# Patient Record
Sex: MALE | Race: OTHER | NOT HISPANIC OR LATINO | URBAN - METROPOLITAN AREA
[De-identification: names, ages, dates, MRNs, and addresses within clinical notes are randomized per-mention and may not be internally consistent; named-entity substitution may affect disease eponyms.]

---

## 2019-02-18 ENCOUNTER — INPATIENT (INPATIENT)
Facility: HOSPITAL | Age: 24
LOS: 1 days | Discharge: HOME | DRG: 203 | End: 2019-02-20
Admitting: HOSPITALIST
Payer: COMMERCIAL

## 2019-02-18 VITALS
WEIGHT: 255.07 LBS | HEART RATE: 126 BPM | OXYGEN SATURATION: 96 % | DIASTOLIC BLOOD PRESSURE: 103 MMHG | TEMPERATURE: 98 F | SYSTOLIC BLOOD PRESSURE: 163 MMHG | RESPIRATION RATE: 22 BRPM | HEIGHT: 72 IN

## 2019-02-18 DIAGNOSIS — J20.9 ACUTE BRONCHITIS, UNSPECIFIED: ICD-10-CM

## 2019-02-18 LAB
ALBUMIN SERPL ELPH-MCNC: 4.2 G/DL — SIGNIFICANT CHANGE UP (ref 3.3–5.2)
ALP SERPL-CCNC: 104 U/L — SIGNIFICANT CHANGE UP (ref 40–120)
ALT FLD-CCNC: 24 U/L — SIGNIFICANT CHANGE UP
ANION GAP SERPL CALC-SCNC: 14 MMOL/L — SIGNIFICANT CHANGE UP (ref 5–17)
AST SERPL-CCNC: 18 U/L — SIGNIFICANT CHANGE UP
BASOPHILS # BLD AUTO: 0 K/UL — SIGNIFICANT CHANGE UP (ref 0–0.2)
BASOPHILS NFR BLD AUTO: 0.1 % — SIGNIFICANT CHANGE UP (ref 0–2)
BILIRUB SERPL-MCNC: 0.6 MG/DL — SIGNIFICANT CHANGE UP (ref 0.4–2)
BUN SERPL-MCNC: 13 MG/DL — SIGNIFICANT CHANGE UP (ref 8–20)
CALCIUM SERPL-MCNC: 9.6 MG/DL — SIGNIFICANT CHANGE UP (ref 8.6–10.2)
CHLORIDE SERPL-SCNC: 98 MMOL/L — SIGNIFICANT CHANGE UP (ref 98–107)
CO2 SERPL-SCNC: 24 MMOL/L — SIGNIFICANT CHANGE UP (ref 22–29)
CREAT SERPL-MCNC: 1.22 MG/DL — SIGNIFICANT CHANGE UP (ref 0.5–1.3)
EOSINOPHIL # BLD AUTO: 1.1 K/UL — HIGH (ref 0–0.5)
EOSINOPHIL NFR BLD AUTO: 7.2 % — HIGH (ref 0–5)
GLUCOSE SERPL-MCNC: 158 MG/DL — HIGH (ref 70–115)
HCOV OC43 RNA SPEC QL NAA+PROBE: DETECTED
HCT VFR BLD CALC: 44.8 % — SIGNIFICANT CHANGE UP (ref 42–52)
HGB BLD-MCNC: 15.2 G/DL — SIGNIFICANT CHANGE UP (ref 14–18)
LACTATE BLDV-MCNC: 1.2 MMOL/L — SIGNIFICANT CHANGE UP (ref 0.5–2)
LYMPHOCYTES # BLD AUTO: 19.7 % — LOW (ref 20–55)
LYMPHOCYTES # BLD AUTO: 3.1 K/UL — SIGNIFICANT CHANGE UP (ref 1–4.8)
MCHC RBC-ENTMCNC: 27.8 PG — SIGNIFICANT CHANGE UP (ref 27–31)
MCHC RBC-ENTMCNC: 33.9 G/DL — SIGNIFICANT CHANGE UP (ref 32–36)
MCV RBC AUTO: 81.9 FL — SIGNIFICANT CHANGE UP (ref 80–94)
MONOCYTES # BLD AUTO: 0.9 K/UL — HIGH (ref 0–0.8)
MONOCYTES NFR BLD AUTO: 5.5 % — SIGNIFICANT CHANGE UP (ref 3–10)
NEUTROPHILS # BLD AUTO: 10.5 K/UL — HIGH (ref 1.8–8)
NEUTROPHILS NFR BLD AUTO: 67.2 % — SIGNIFICANT CHANGE UP (ref 37–73)
PLATELET # BLD AUTO: 266 K/UL — SIGNIFICANT CHANGE UP (ref 150–400)
POTASSIUM SERPL-MCNC: 4.1 MMOL/L — SIGNIFICANT CHANGE UP (ref 3.5–5.3)
POTASSIUM SERPL-SCNC: 4.1 MMOL/L — SIGNIFICANT CHANGE UP (ref 3.5–5.3)
PROT SERPL-MCNC: 8.8 G/DL — HIGH (ref 6.6–8.7)
RAPID RVP RESULT: DETECTED
RBC # BLD: 5.47 M/UL — SIGNIFICANT CHANGE UP (ref 4.6–6.2)
RBC # FLD: 13.8 % — SIGNIFICANT CHANGE UP (ref 11–15.6)
SODIUM SERPL-SCNC: 136 MMOL/L — SIGNIFICANT CHANGE UP (ref 135–145)
WBC # BLD: 15.6 K/UL — HIGH (ref 4.8–10.8)
WBC # FLD AUTO: 15.6 K/UL — HIGH (ref 4.8–10.8)

## 2019-02-18 PROCEDURE — 99223 1ST HOSP IP/OBS HIGH 75: CPT

## 2019-02-18 PROCEDURE — 71046 X-RAY EXAM CHEST 2 VIEWS: CPT | Mod: 26

## 2019-02-18 PROCEDURE — 99285 EMERGENCY DEPT VISIT HI MDM: CPT

## 2019-02-18 RX ORDER — IPRATROPIUM/ALBUTEROL SULFATE 18-103MCG
3 AEROSOL WITH ADAPTER (GRAM) INHALATION ONCE
Qty: 0 | Refills: 0 | Status: COMPLETED | OUTPATIENT
Start: 2019-02-18 | End: 2019-02-18

## 2019-02-18 RX ORDER — CEFTRIAXONE 500 MG/1
1 INJECTION, POWDER, FOR SOLUTION INTRAMUSCULAR; INTRAVENOUS ONCE
Qty: 0 | Refills: 0 | Status: COMPLETED | OUTPATIENT
Start: 2019-02-18 | End: 2019-02-18

## 2019-02-18 RX ORDER — FAMOTIDINE 10 MG/ML
20 INJECTION INTRAVENOUS ONCE
Qty: 0 | Refills: 0 | Status: COMPLETED | OUTPATIENT
Start: 2019-02-18 | End: 2019-02-18

## 2019-02-18 RX ORDER — DEXAMETHASONE 0.5 MG/5ML
10 ELIXIR ORAL ONCE
Qty: 0 | Refills: 0 | Status: COMPLETED | OUTPATIENT
Start: 2019-02-18 | End: 2019-02-18

## 2019-02-18 RX ORDER — SODIUM CHLORIDE 9 MG/ML
1000 INJECTION INTRAMUSCULAR; INTRAVENOUS; SUBCUTANEOUS ONCE
Qty: 0 | Refills: 0 | Status: COMPLETED | OUTPATIENT
Start: 2019-02-18 | End: 2019-02-18

## 2019-02-18 RX ORDER — DIPHENHYDRAMINE HCL 50 MG
50 CAPSULE ORAL ONCE
Qty: 0 | Refills: 0 | Status: COMPLETED | OUTPATIENT
Start: 2019-02-18 | End: 2019-02-18

## 2019-02-18 RX ORDER — MAGNESIUM SULFATE 500 MG/ML
2 VIAL (ML) INJECTION ONCE
Qty: 0 | Refills: 0 | Status: COMPLETED | OUTPATIENT
Start: 2019-02-18 | End: 2019-02-18

## 2019-02-18 RX ORDER — AZITHROMYCIN 500 MG/1
500 TABLET, FILM COATED ORAL ONCE
Qty: 0 | Refills: 0 | Status: COMPLETED | OUTPATIENT
Start: 2019-02-18 | End: 2019-02-18

## 2019-02-18 RX ORDER — LEVALBUTEROL 1.25 MG/.5ML
0.63 SOLUTION, CONCENTRATE RESPIRATORY (INHALATION) EVERY 6 HOURS
Qty: 0 | Refills: 0 | Status: DISCONTINUED | OUTPATIENT
Start: 2019-02-18 | End: 2019-02-19

## 2019-02-18 RX ADMIN — SODIUM CHLORIDE 1000 MILLILITER(S): 9 INJECTION INTRAMUSCULAR; INTRAVENOUS; SUBCUTANEOUS at 20:23

## 2019-02-18 RX ADMIN — FAMOTIDINE 20 MILLIGRAM(S): 10 INJECTION INTRAVENOUS at 19:26

## 2019-02-18 RX ADMIN — AZITHROMYCIN 500 MILLIGRAM(S): 500 TABLET, FILM COATED ORAL at 22:29

## 2019-02-18 RX ADMIN — Medication 3 MILLILITER(S): at 17:30

## 2019-02-18 RX ADMIN — Medication 50 MILLIGRAM(S): at 19:25

## 2019-02-18 RX ADMIN — Medication 102 MILLIGRAM(S): at 19:23

## 2019-02-18 RX ADMIN — CEFTRIAXONE 100 GRAM(S): 500 INJECTION, POWDER, FOR SOLUTION INTRAMUSCULAR; INTRAVENOUS at 22:31

## 2019-02-18 RX ADMIN — Medication 10 MILLIGRAM(S): at 20:14

## 2019-02-18 RX ADMIN — AZITHROMYCIN 255 MILLIGRAM(S): 500 TABLET, FILM COATED ORAL at 21:29

## 2019-02-18 RX ADMIN — Medication 50 GRAM(S): at 20:14

## 2019-02-18 RX ADMIN — Medication 2 GRAM(S): at 21:14

## 2019-02-18 RX ADMIN — SODIUM CHLORIDE 1000 MILLILITER(S): 9 INJECTION INTRAMUSCULAR; INTRAVENOUS; SUBCUTANEOUS at 19:23

## 2019-02-18 NOTE — H&P ADULT - HISTORY OF PRESENT ILLNESS
23yoM hx HTN, not on meds, presenting with mildly productive cough and nasal congestion for the past week then yesterday developed exertional dyspnea associated with audible wheezing.  He has been taking OTC cold medications and reports improvement in his cough.  Also went to his PMD a few days ago and was prescribed Augmentin.  Denies fevers, chills, chest pain, abdominal pain, nausea, vomiting, diarrhea and FHx or personal hx of asthma. In ED, pt afebrile, tachycardia and was coronavirus positive, WBC 15.  CXR on my read appears clear.  Received ceftriaxone, azithromycin, Mg, decadron, DuoNebs x 2 in ED.    Per ED provider note, had O2 sat of 93% when laying flat and that with tachycardia meet exclusion criteria for observation unit. 23yoM hx HTN, not on meds, presenting with productive cough and nasal congestion for the past week then yesterday developed exertional dyspnea associated with audible wheezing.  He has been taking OTC cold medications and reports improvement in his cough.  Also went to his PMD a few days ago and was prescribed Augmentin.  Denies fevers, chills, chest pain, abdominal pain, nausea, vomiting, diarrhea and FHx or personal hx of asthma. In ED, pt afebrile, tachycardia and was coronavirus positive, WBC 15.  CXR on my read appears clear.  Received ceftriaxone, azithromycin, Mg, decadron, DuoNebs x 2 in ED.    Per ED provider note, had O2 sat of 93% when laying flat and that with tachycardia meet exclusion criteria for observation unit.

## 2019-02-18 NOTE — ED STATDOCS - SECONDARY DIAGNOSIS.
Pneumonia due to infectious organism, unspecified laterality, unspecified part of lung SOB (shortness of breath) Tachypnea

## 2019-02-18 NOTE — ED ADULT TRIAGE NOTE - CHIEF COMPLAINT QUOTE
Patient arrived to ED today with c/o cough for the past week with white sputum.  Patient states when he is walking his cough gets worse and he feels SOB when coughing.

## 2019-02-18 NOTE — ED STATDOCS - ENMT, MLM
Nasal mucosa clear.  Mouth with normal mucosa. Neck supple, FROM. enlarged tonsils, erythema to throat.

## 2019-02-18 NOTE — ED STATDOCS - PROGRESS NOTE DETAILS
I performed the initial face to face bedside interview with this patient regarding history of present illness, review of symptoms and past medical, social and family history.  I completed an independent physical examination.  I was the initial provider who evaluated this patient.  The history, review of symptoms and examination was documented by the scribe in my presence and I attest to the accuracy of the documentation.  I have signed out the follow up of any pending tests (i.e. labs, radiological studies) to the PA.  I have discussed the patient’s plan of care and disposition with the PA. pt is initially seen By Dr mclcure agreed with hx and JOJO and plan   pt states he feels better after neb tx , states was taking z pack given by his Pcp - SOB as walking - exam not in respiratory distress with expiratory wheeze on all lungs field will add mg and rest of med will re eval after cxr pt is still wheezing despite receiving the 3 Duoneb and prednisone cxr d/w dr mcclure recommended the admission pt is initially seen By Dr mcclure agreed with hx and JOJO and plan   pt states he feels better after neb tx , states was taking amoxicillin given by his Pcp since saturday- SOB as walking - exam not in respiratory distress with expiratory wheeze on all lungs field will add mg and rest of med will re eval after cxr pt is initially seen By Dr mcclure agreed with hx and JOJO and plan   pt states he feels better after neb tx , states was taking amoxicillin given by his Pcp since Saturday- SOB as walking - exam not in respiratory distress with expiratory wheeze on all lungs field will add mg and rest of med will re eval after cxr vital signes re checked - pt states he feels much better , not in respiratory distress - re examine the lugs with less expiratory wheeze noted  d/w dr mcclure will send the pt to [facility] obs vital signes re checked - pt states he better , o2 is 93% when he lays flat , tachypnea at 22 ,tachycardic at 119 and has exclusion criteria for observation. lung diffused  wheezing  on expiratory D/w dr mcclure will admit the pt . called admitting physician for admission

## 2019-02-18 NOTE — H&P ADULT - ASSESSMENT
23yoM hx HTN, not on meds, presenting with mildly productive cough and nasal congestion for the past week then yesterday developed exertional dyspnea associated with audible wheezing, found to be tachycardia, with leukocytosis and coronavirus positive, admitted for bronchospasm in setting of viral infection    #Bronchospasm in setting of viral infection  -Admit to monitored unit with   -Will start IV steroids BID  -Standing xopenox   -Anti-tussive PRN  -Afebrile, no consolidation on CXR, monitor off antibiotics for now and check procalcitonin     #Sepsis- mild, in setting of viral infection as above. Meets sepsis criteria given leukocytosis, tachycardia and respiratory source.  Lactate 1.2.  Received 1L in ED.  Will hold off on maintenance IVF given elevated BPs.     #Tachycardia- EKG shows sinus tachycardia, likely related to infectious process withbronchospasm. Well's score 1, PE unlikely.  Will admit to monitored unit as above.    #HTN- being monitored off meds, PMD had encouraged weight loss.  BP elevated on admission, will monitor for now but if worsening, may require BP agent.    #Prophylactic measure- no AC as low risk. 23yoM hx HTN, not on meds, presenting with mildly productive cough and nasal congestion for the past week then yesterday developed exertional dyspnea associated with audible wheezing, found to be tachycardia, with leukocytosis and coronavirus positive, admitted for bronchospasm in setting of viral infection    #Bronchospasm in setting of viral infection  -Admit to monitored unit with   -Will start IV steroids BID  -Standing xopenox   -Anti-tussive PRN  -Afebrile, no consolidation on CXR, monitor off antibiotics for now and check procalcitonin     #Sepsis- mild, in setting of viral infection as above. Meets sepsis criteria given leukocytosis, tachycardia and respiratory source.  Lactate 1.2.  Received 1L in ED.  Will hold off on maintenance IVF given elevated BPs.     #Tachycardia- EKG shows sinus tachycardia, related to infectious process with bronchospasm. Well's score 1, PE unlikely.  Will admit to monitored unit as above.    #HTN- being monitored off meds, PMD had encouraged weight loss.  BP elevated on admission, will monitor for now but if worsening, may require BP agent.    #Prophylactic measure- no AC as low risk.

## 2019-02-18 NOTE — H&P ADULT - NSHPPHYSICALEXAM_GEN_ALL_CORE
Vital Signs Last 24 Hrs  T(C): 36.8 (18 Feb 2019 20:13), Max: 36.8 (18 Feb 2019 16:43)  T(F): 98.2 (18 Feb 2019 20:13), Max: 98.3 (18 Feb 2019 16:43)  HR: 119 (18 Feb 2019 20:13) (119 - 126)  BP: 160/92 (18 Feb 2019 20:13) (160/92 - 163/103)  BP(mean): --  RR: 22 (18 Feb 2019 20:13) (22 - 22)  SpO2: 98% (18 Feb 2019 20:13) (96% - 98%)    GENERAL:  Well-appearing, not in acute distress  EYES:  Clear conjuctiva, extraocular movement intact  ENT: Moist mucous membranes  RESP:  Non-labored breathing pattern, diffuse wheezing on exam   CV: Regular rate and rhythm, no murmurs appreciated, no lower extremity edema  GI: Soft, non-tender, non-distended  NEURO: Awake, alert, conversant, upper and lower extremity strength 5/5, light touch sensation grossly intact  PSYCH: Calm, cooperative  SKIN: No rash or lesions, warm and dry

## 2019-02-18 NOTE — ED STATDOCS - OBJECTIVE STATEMENT
24 y/o M pt presents to the ED c/o SOB beginning yesterday.  Pt has had a cough and nasal congestion for the past one week, and has been taking OTC cold medication.  Pt saw his PMD 3 days ago for these symptoms, was told everything was normal.  He notes feeling SOB when after walking for approx 1 minute yesterday.  Pt exercises frequently, does not typically feel SOB when walking.  No recent travel, no sick contacts, no hx of asthma.  Denies fever, chills, CP, N/V.  No further acute complaints at this time. 22 y/o M pt presents to the ED c/o SOB beginning yesterday.  Pt has had a cough and nasal congestion for the past one week, and has been taking OTC cold medication.  Pt saw his PMD 3 days ago for these symptoms, was told everything was normal.  He notes feeling SOB when after walking for approx. 1 minute yesterday.  Pt exercises frequently, does not typically feel SOB when walking.  No recent travel, no sick contacts, no hx of asthma.  Denies fever, chills, CP, N/V.  No further acute complaints at this time.

## 2019-02-18 NOTE — H&P ADULT - NSHPLABSRESULTS_GEN_ALL_CORE
15.2   15.6  )-----------( 266      ( 18 Feb 2019 17:41 )             44.8       02-18    136  |  98  |  13.0  ----------------------------<  158<H>  4.1   |  24.0  |  1.22    Ca    9.6      18 Feb 2019 17:41    TPro  8.8<H>  /  Alb  4.2  /  TBili  0.6  /  DBili  x   /  AST  18  /  ALT  24  /  AlkPhos  104  02-18    EKG: sinus tachycardia, , no ST-T changes    Rapid Respiratory Viral Panel (02.18.19 @ 20:32)    OC43 Coronavirus (RapRVP): Detected    CXR (my read): appears clear

## 2019-02-18 NOTE — ED ADULT NURSE NOTE - NSIMPLEMENTINTERV_GEN_ALL_ED
Implemented All Universal Safety Interventions:  [place] to call system. Call bell, personal items and telephone within reach. Instruct patient to call for assistance. Room bathroom lighting operational. Non-slip footwear when patient is off stretcher. Physically safe environment: no spills, clutter or unnecessary equipment. Stretcher in lowest position, wheels locked, appropriate side rails in place.

## 2019-02-18 NOTE — ED STATDOCS - CARE PLAN
Principal Discharge DX:	Acute bronchitis, unspecified organism  Secondary Diagnosis:	Pneumonia due to infectious organism, unspecified laterality, unspecified part of lung  Secondary Diagnosis:	SOB (shortness of breath)  Secondary Diagnosis:	Tachypnea

## 2019-02-18 NOTE — ED STATDOCS - RESPIRATORY, MLM
b/l profuse wheezing, good aeration, no accessory muscle usage, tachypneic, speaking in full sentences.

## 2019-02-19 DIAGNOSIS — I10 ESSENTIAL (PRIMARY) HYPERTENSION: ICD-10-CM

## 2019-02-19 DIAGNOSIS — J20.9 ACUTE BRONCHITIS, UNSPECIFIED: ICD-10-CM

## 2019-02-19 LAB
BASOPHILS # BLD AUTO: 0 K/UL — SIGNIFICANT CHANGE UP (ref 0–0.2)
BASOPHILS NFR BLD AUTO: 0.1 % — SIGNIFICANT CHANGE UP (ref 0–2)
EOSINOPHIL # BLD AUTO: 0 K/UL — SIGNIFICANT CHANGE UP (ref 0–0.5)
EOSINOPHIL NFR BLD AUTO: 0.2 % — SIGNIFICANT CHANGE UP (ref 0–5)
HCT VFR BLD CALC: 42.9 % — SIGNIFICANT CHANGE UP (ref 42–52)
HGB BLD-MCNC: 14.4 G/DL — SIGNIFICANT CHANGE UP (ref 14–18)
LYMPHOCYTES # BLD AUTO: 1.8 K/UL — SIGNIFICANT CHANGE UP (ref 1–4.8)
LYMPHOCYTES # BLD AUTO: 14.7 % — LOW (ref 20–55)
MCHC RBC-ENTMCNC: 27.4 PG — SIGNIFICANT CHANGE UP (ref 27–31)
MCHC RBC-ENTMCNC: 33.6 G/DL — SIGNIFICANT CHANGE UP (ref 32–36)
MCV RBC AUTO: 81.6 FL — SIGNIFICANT CHANGE UP (ref 80–94)
MONOCYTES # BLD AUTO: 0.5 K/UL — SIGNIFICANT CHANGE UP (ref 0–0.8)
MONOCYTES NFR BLD AUTO: 4.1 % — SIGNIFICANT CHANGE UP (ref 3–10)
NEUTROPHILS # BLD AUTO: 10.1 K/UL — HIGH (ref 1.8–8)
NEUTROPHILS NFR BLD AUTO: 80.3 % — HIGH (ref 37–73)
PLATELET # BLD AUTO: 280 K/UL — SIGNIFICANT CHANGE UP (ref 150–400)
PROCALCITONIN SERPL-MCNC: 0.03 NG/ML — SIGNIFICANT CHANGE UP (ref 0.02–0.1)
RBC # BLD: 5.26 M/UL — SIGNIFICANT CHANGE UP (ref 4.6–6.2)
RBC # FLD: 14 % — SIGNIFICANT CHANGE UP (ref 11–15.6)
WBC # BLD: 12.6 K/UL — HIGH (ref 4.8–10.8)
WBC # FLD AUTO: 12.6 K/UL — HIGH (ref 4.8–10.8)

## 2019-02-19 PROCEDURE — 99232 SBSQ HOSP IP/OBS MODERATE 35: CPT

## 2019-02-19 RX ORDER — LEVALBUTEROL 1.25 MG/.5ML
0.63 SOLUTION, CONCENTRATE RESPIRATORY (INHALATION) EVERY 6 HOURS
Qty: 0 | Refills: 0 | Status: DISCONTINUED | OUTPATIENT
Start: 2019-02-19 | End: 2019-02-20

## 2019-02-19 RX ORDER — INFLUENZA VIRUS VACCINE 15; 15; 15; 15 UG/.5ML; UG/.5ML; UG/.5ML; UG/.5ML
0.5 SUSPENSION INTRAMUSCULAR ONCE
Qty: 0 | Refills: 0 | Status: COMPLETED | OUTPATIENT
Start: 2019-02-19 | End: 2019-02-20

## 2019-02-19 RX ADMIN — LEVALBUTEROL 0.63 MILLIGRAM(S): 1.25 SOLUTION, CONCENTRATE RESPIRATORY (INHALATION) at 02:24

## 2019-02-19 RX ADMIN — Medication 40 MILLIGRAM(S): at 05:53

## 2019-02-19 RX ADMIN — LEVALBUTEROL 0.63 MILLIGRAM(S): 1.25 SOLUTION, CONCENTRATE RESPIRATORY (INHALATION) at 09:06

## 2019-02-19 RX ADMIN — Medication 40 MILLIGRAM(S): at 17:43

## 2019-02-19 NOTE — PROGRESS NOTE ADULT - ASSESSMENT
23 yr old male with hypertension admitted with 1 week of worsening cough, runny nose, associated with exertional dyspnea and wheezing. In ED, noted to be tachycardic and tested positive for coronavirus. He was started on IV steroids.

## 2019-02-20 ENCOUNTER — TRANSCRIPTION ENCOUNTER (OUTPATIENT)
Age: 24
End: 2019-02-20

## 2019-02-20 VITALS
HEART RATE: 89 BPM | RESPIRATION RATE: 18 BRPM | DIASTOLIC BLOOD PRESSURE: 80 MMHG | TEMPERATURE: 98 F | SYSTOLIC BLOOD PRESSURE: 130 MMHG | OXYGEN SATURATION: 97 %

## 2019-02-20 PROCEDURE — 96375 TX/PRO/DX INJ NEW DRUG ADDON: CPT

## 2019-02-20 PROCEDURE — 90686 IIV4 VACC NO PRSV 0.5 ML IM: CPT

## 2019-02-20 PROCEDURE — 94640 AIRWAY INHALATION TREATMENT: CPT

## 2019-02-20 PROCEDURE — 87581 M.PNEUMON DNA AMP PROBE: CPT

## 2019-02-20 PROCEDURE — 36415 COLL VENOUS BLD VENIPUNCTURE: CPT

## 2019-02-20 PROCEDURE — 84145 PROCALCITONIN (PCT): CPT

## 2019-02-20 PROCEDURE — 87633 RESP VIRUS 12-25 TARGETS: CPT

## 2019-02-20 PROCEDURE — 87486 CHLMYD PNEUM DNA AMP PROBE: CPT

## 2019-02-20 PROCEDURE — 87798 DETECT AGENT NOS DNA AMP: CPT

## 2019-02-20 PROCEDURE — 93306 TTE W/DOPPLER COMPLETE: CPT

## 2019-02-20 PROCEDURE — 99238 HOSP IP/OBS DSCHRG MGMT 30/<: CPT

## 2019-02-20 PROCEDURE — 71046 X-RAY EXAM CHEST 2 VIEWS: CPT

## 2019-02-20 PROCEDURE — 96365 THER/PROPH/DIAG IV INF INIT: CPT

## 2019-02-20 PROCEDURE — 96367 TX/PROPH/DG ADDL SEQ IV INF: CPT

## 2019-02-20 PROCEDURE — 85027 COMPLETE CBC AUTOMATED: CPT

## 2019-02-20 PROCEDURE — 93005 ELECTROCARDIOGRAM TRACING: CPT

## 2019-02-20 PROCEDURE — 80053 COMPREHEN METABOLIC PANEL: CPT

## 2019-02-20 PROCEDURE — 83605 ASSAY OF LACTIC ACID: CPT

## 2019-02-20 PROCEDURE — 99285 EMERGENCY DEPT VISIT HI MDM: CPT | Mod: 25

## 2019-02-20 RX ORDER — OMEPRAZOLE 10 MG/1
1 CAPSULE, DELAYED RELEASE ORAL
Qty: 5 | Refills: 0 | OUTPATIENT
Start: 2019-02-20 | End: 2019-02-24

## 2019-02-20 RX ADMIN — Medication 40 MILLIGRAM(S): at 07:17

## 2019-02-20 RX ADMIN — INFLUENZA VIRUS VACCINE 0.5 MILLILITER(S): 15; 15; 15; 15 SUSPENSION INTRAMUSCULAR at 12:51

## 2019-02-20 NOTE — PROGRESS NOTE ADULT - SUBJECTIVE AND OBJECTIVE BOX
INTERVAL HPI/OVERNIGHT EVENTS:    CC: SIRS, bronchospasm    Cough improved, shortness of breath improving.    Vital Signs Last 24 Hrs  T(C): 36.7 (19 Feb 2019 07:39), Max: 36.8 (18 Feb 2019 16:43)  T(F): 98 (19 Feb 2019 07:39), Max: 98.3 (18 Feb 2019 16:43)  HR: 111 (19 Feb 2019 07:39) (88 - 126)  BP: 164/103 (19 Feb 2019 07:39) (160/92 - 164/103)  BP(mean): --  RR: 22 (18 Feb 2019 20:13) (22 - 22)  SpO2: 95% (19 Feb 2019 07:39) (95% - 98%)    PHYSICAL EXAM:    GENERAL: Not in distress, alert  CHEST/LUNG: b/l air entry, diffuse wheezing  HEART: Regular  ABDOMEN: Soft, BS+  EXTREMITIES:  No edema, tenderness    MEDICATIONS  (STANDING):  influenza   Vaccine 0.5 milliLiter(s) IntraMuscular once  levalbuterol Inhalation 0.63 milliGRAM(s) Inhalation every 6 hours  methylPREDNISolone sodium succinate Injectable 40 milliGRAM(s) IV Push two times a day    MEDICATIONS  (PRN):  benzonatate 100 milliGRAM(s) Oral three times a day PRN Cough  guaiFENesin    Syrup 100 milliGRAM(s) Oral every 6 hours PRN Cough      Allergies    No Known Allergies    Intolerances          LABS:                          14.4   12.6  )-----------( 280      ( 19 Feb 2019 07:25 )             42.9     02-18    136  |  98  |  13.0  ----------------------------<  158<H>  4.1   |  24.0  |  1.22    Ca    9.6      18 Feb 2019 17:41    TPro  8.8<H>  /  Alb  4.2  /  TBili  0.6  /  DBili  x   /  AST  18  /  ALT  24  /  AlkPhos  104  02-18          RADIOLOGY & ADDITIONAL TESTS:
INTERVAL HPI/OVERNIGHT EVENTS:    CC: bronchospasm, tachycardia resolved    No overnight events, denies complaints. Feels better.    Vital Signs Last 24 Hrs  T(C): 36.8 (20 Feb 2019 08:00), Max: 36.8 (20 Feb 2019 08:00)  T(F): 98.3 (20 Feb 2019 08:00), Max: 98.3 (20 Feb 2019 08:00)  HR: 89 (20 Feb 2019 08:00) (89 - 99)  BP: 144/84 (20 Feb 2019 08:00) (136/77 - 162/90)  BP(mean): --  RR: 18 (20 Feb 2019 08:00) (18 - 18)  SpO2: 95% (20 Feb 2019 08:00) (95% - 97%)    PHYSICAL EXAM:    GENERAL: Not in distress, alert  CHEST/LUNG: b/l air entry, wheezing improved.  HEART: Regular  ABDOMEN: Soft, BS+  EXTREMITIES:  No edema, tenderness    MEDICATIONS  (STANDING):  influenza   Vaccine 0.5 milliLiter(s) IntraMuscular once  methylPREDNISolone sodium succinate Injectable 40 milliGRAM(s) IV Push two times a day    MEDICATIONS  (PRN):  benzonatate 100 milliGRAM(s) Oral three times a day PRN Cough  guaiFENesin    Syrup 100 milliGRAM(s) Oral every 6 hours PRN Cough  levalbuterol for Nebulization - Peds 0.63 milliGRAM(s) Nebulizer every 6 hours PRN wheezing      Allergies    No Known Allergies    Intolerances          LABS:                          14.4   12.6  )-----------( 280      ( 19 Feb 2019 07:25 )             42.9     02-18    136  |  98  |  13.0  ----------------------------<  158<H>  4.1   |  24.0  |  1.22    Ca    9.6      18 Feb 2019 17:41    TPro  8.8<H>  /  Alb  4.2  /  TBili  0.6  /  DBili  x   /  AST  18  /  ALT  24  /  AlkPhos  104  02-18          RADIOLOGY & ADDITIONAL TESTS:

## 2019-02-20 NOTE — PROGRESS NOTE ADULT - PROBLEM SELECTOR PLAN 1
Improving, continue IV steroids today, monitor pulse ox. Transition to PO in am Start PO steroids, advised PMD follow up.

## 2019-02-20 NOTE — DISCHARGE NOTE ADULT - PLAN OF CARE
Resolution of symptoms. Complete steroid taper. Follow up with PMD in 1 week. Monitor BP with PMD, weight loss, low salt diet.

## 2019-02-20 NOTE — DISCHARGE NOTE ADULT - HOSPITAL COURSE
23 yr old male with hypertension admitted with 1 week of worsening cough, runny nose, associated with exertional dyspnea and wheezing. In ED, noted to be tachycardic and tested positive for coronavirus. He was started on IV steroids. ECHO was ordered which revealed normal LV function. Heart rate improved. 23 yr old male with hypertension admitted with 1 week of worsening cough, runny nose, associated with exertional dyspnea and wheezing. In ED, noted to be tachycardic and tested positive for coronavirus. He was started on IV steroids. ECHO was ordered which revealed normal LV function. Heart rate improved. He was given steroid taper, advised to follow up with PMD. Stable for discharge home.    Spent 30 mins in discharge plan and documentation.

## 2019-02-20 NOTE — DISCHARGE NOTE ADULT - PATIENT PORTAL LINK FT
You can access the [facility] Patient Portal, offered by [facility], by registering with the following website: [email or web address]

## 2019-02-20 NOTE — DISCHARGE NOTE ADULT - ADDITIONAL INSTRUCTIONS
Continue medications as instructed, follow up with PMD in 1 week. Return to ED should symptoms worsen.

## 2019-02-20 NOTE — DISCHARGE NOTE ADULT - MEDICATION SUMMARY - MEDICATIONS TO TAKE
I will START or STAY ON the medications listed below when I get home from the hospital:    predniSONE 20 mg oral tablet  -- 1 tab(s) by mouth once a day   -- It is very important that you take or use this exactly as directed.  Do not skip doses or discontinue unless directed by your doctor.  Obtain medical advice before taking any non-prescription drugs as some may affect the action of this medication.  Take with food or milk.    -- Indication: For Acute bronchitis    guaiFENesin 100 mg/5 mL oral liquid  -- 5 milliliter(s) by mouth every 6 hours, As needed, Cough  -- Indication: For Acute bronchitis    omeprazole 20 mg oral delayed release tablet  -- 1 tab(s) by mouth once a day   -- Obtain medical advice before taking any non-prescription drugs as some may affect the action of this medication.  Swallow whole.  Do not crush.    -- Indication: For gi prophylaxis

## 2019-02-20 NOTE — PROGRESS NOTE ADULT - ASSESSMENT
23 yr old male with hypertension admitted with 1 week of worsening cough, runny nose, associated with exertional dyspnea and wheezing. In ED, noted to be tachycardic and tested positive for coronavirus. He was started on IV steroids. ECHO was ordered which revealed normal LV function. Heart rate improved.

## 2019-02-20 NOTE — DISCHARGE NOTE ADULT - CARE PLAN
Principal Discharge DX:	Acute bronchitis, unspecified organism  Goal:	Resolution of symptoms.  Assessment and plan of treatment:	Complete steroid taper. Follow up with PMD in 1 week.  Secondary Diagnosis:	Hypertension  Assessment and plan of treatment:	Monitor BP with PMD, weight loss, low salt diet.

## 2021-09-08 NOTE — PATIENT PROFILE ADULT - NSPROGENARRIVEDFROM_GEN_A_NUR
LVMOM ON C# REGARDING FORM COMPLETION. THE ORIGINALS ARE AVAIL FOR  AT THE [facility] LOCATION. acute rehab

## 2022-03-01 PROBLEM — I10 ESSENTIAL (PRIMARY) HYPERTENSION: Chronic | Status: ACTIVE | Noted: 2019-02-18

## 2022-03-03 PROBLEM — Z00.00 ENCOUNTER FOR PREVENTIVE HEALTH EXAMINATION: Status: ACTIVE | Noted: 2022-03-03

## 2022-03-08 ENCOUNTER — APPOINTMENT (OUTPATIENT)
Dept: PULMONOLOGY | Facility: CLINIC | Age: 27
End: 2022-03-08
Payer: MEDICAID

## 2022-03-08 VITALS
WEIGHT: 239 LBS | HEART RATE: 69 BPM | RESPIRATION RATE: 16 BRPM | HEIGHT: 71 IN | BODY MASS INDEX: 33.46 KG/M2 | SYSTOLIC BLOOD PRESSURE: 124 MMHG | DIASTOLIC BLOOD PRESSURE: 72 MMHG | OXYGEN SATURATION: 99 %

## 2022-03-08 DIAGNOSIS — R09.82 POSTNASAL DRIP: ICD-10-CM

## 2022-03-08 DIAGNOSIS — R05.9 COUGH, UNSPECIFIED: ICD-10-CM

## 2022-03-08 DIAGNOSIS — G47.33 OBSTRUCTIVE SLEEP APNEA (ADULT) (PEDIATRIC): ICD-10-CM

## 2022-03-08 DIAGNOSIS — Z82.5 FAMILY HISTORY OF ASTHMA AND OTHER CHRONIC LOWER RESPIRATORY DISEASES: ICD-10-CM

## 2022-03-08 DIAGNOSIS — Z82.49 FAMILY HISTORY OF ISCHEMIC HEART DISEASE AND OTHER DISEASES OF THE CIRCULATORY SYSTEM: ICD-10-CM

## 2022-03-08 DIAGNOSIS — R06.2 WHEEZING: ICD-10-CM

## 2022-03-08 DIAGNOSIS — Z86.16 PERSONAL HISTORY OF COVID-19: ICD-10-CM

## 2022-03-08 DIAGNOSIS — J45.909 UNSPECIFIED ASTHMA, UNCOMPLICATED: ICD-10-CM

## 2022-03-08 PROCEDURE — 99204 OFFICE O/P NEW MOD 45 MIN: CPT

## 2022-03-08 RX ORDER — BUDESONIDE, GLYCOPYRROLATE, AND FORMOTEROL FUMARATE 160; 9; 4.8 UG/1; UG/1; UG/1
AEROSOL, METERED RESPIRATORY (INHALATION)
Refills: 0 | Status: ACTIVE | COMMUNITY

## 2022-03-08 RX ORDER — ALBUTEROL SULFATE 90 UG/1
AEROSOL, METERED RESPIRATORY (INHALATION)
Refills: 0 | Status: ACTIVE | COMMUNITY

## 2022-04-07 ENCOUNTER — APPOINTMENT (OUTPATIENT)
Dept: PULMONOLOGY | Facility: CLINIC | Age: 27
End: 2022-04-07

## 2025-08-19 ENCOUNTER — NON-APPOINTMENT (OUTPATIENT)
Age: 30
End: 2025-08-19

## 2025-08-21 ENCOUNTER — APPOINTMENT (OUTPATIENT)
Dept: NEUROLOGY | Facility: CLINIC | Age: 30
End: 2025-08-21
Payer: COMMERCIAL

## 2025-08-21 ENCOUNTER — NON-APPOINTMENT (OUTPATIENT)
Age: 30
End: 2025-08-21

## 2025-08-21 VITALS
DIASTOLIC BLOOD PRESSURE: 90 MMHG | WEIGHT: 255 LBS | BODY MASS INDEX: 35.7 KG/M2 | SYSTOLIC BLOOD PRESSURE: 138 MMHG | HEART RATE: 56 BPM | HEIGHT: 71 IN

## 2025-08-21 DIAGNOSIS — R41.840 ATTENTION AND CONCENTRATION DEFICIT: ICD-10-CM

## 2025-08-21 PROCEDURE — G2211 COMPLEX E/M VISIT ADD ON: CPT | Mod: NC

## 2025-08-21 PROCEDURE — 99204 OFFICE O/P NEW MOD 45 MIN: CPT

## 2025-09-03 ENCOUNTER — APPOINTMENT (OUTPATIENT)
Dept: NEUROLOGY | Facility: CLINIC | Age: 30
End: 2025-09-03
Payer: COMMERCIAL

## 2025-09-03 PROCEDURE — 95819 EEG AWAKE AND ASLEEP: CPT

## 2025-09-03 PROCEDURE — 93040 RHYTHM ECG WITH REPORT: CPT

## 2025-09-16 ENCOUNTER — NON-APPOINTMENT (OUTPATIENT)
Age: 30
End: 2025-09-16

## 2025-09-16 DIAGNOSIS — F90.9 ATTENTION-DEFICIT HYPERACTIVITY DISORDER, UNSPECIFIED TYPE: ICD-10-CM

## 2025-09-16 RX ORDER — DEXTROAMPHETAMINE SACCHARATE, AMPHETAMINE ASPARTATE, DEXTROAMPHETAMINE SULFATE AND AMPHETAMINE SULFATE 2.5; 2.5; 2.5; 2.5 MG/1; MG/1; MG/1; MG/1
10 TABLET ORAL
Qty: 30 | Refills: 0 | Status: ACTIVE | COMMUNITY
Start: 2025-09-16 | End: 1900-01-01